# Patient Record
Sex: FEMALE | Race: WHITE | NOT HISPANIC OR LATINO | Employment: FULL TIME | URBAN - METROPOLITAN AREA
[De-identification: names, ages, dates, MRNs, and addresses within clinical notes are randomized per-mention and may not be internally consistent; named-entity substitution may affect disease eponyms.]

---

## 2024-09-04 ENCOUNTER — HOSPITAL ENCOUNTER (EMERGENCY)
Facility: CLINIC | Age: 29
Discharge: HOME | End: 2024-09-04
Admitting: PHYSICIAN ASSISTANT

## 2024-09-04 VITALS
OXYGEN SATURATION: 99 % | HEIGHT: 69 IN | HEART RATE: 77 BPM | DIASTOLIC BLOOD PRESSURE: 60 MMHG | TEMPERATURE: 98.5 F | RESPIRATION RATE: 16 BRPM | SYSTOLIC BLOOD PRESSURE: 133 MMHG | WEIGHT: 185 LBS | BODY MASS INDEX: 27.4 KG/M2

## 2024-09-04 DIAGNOSIS — N93.9 VAGINAL BLEEDING: ICD-10-CM

## 2024-09-04 DIAGNOSIS — R10.2 PELVIC PAIN: ICD-10-CM

## 2024-09-04 LAB
ABO/RH(D): NORMAL
ANION GAP SERPL CALCULATED.3IONS-SCNC: 12 MMOL/L (ref 7–15)
ANTIBODY SCREEN: NEGATIVE
BASOPHILS # BLD AUTO: 0 10E3/UL (ref 0–0.2)
BASOPHILS NFR BLD AUTO: 1 %
BUN SERPL-MCNC: 11.7 MG/DL (ref 6–20)
CALCIUM SERPL-MCNC: 9.4 MG/DL (ref 8.8–10.4)
CHLORIDE SERPL-SCNC: 106 MMOL/L (ref 98–107)
CREAT SERPL-MCNC: 0.75 MG/DL (ref 0.51–0.95)
EGFRCR SERPLBLD CKD-EPI 2021: >90 ML/MIN/1.73M2
EOSINOPHIL # BLD AUTO: 0.2 10E3/UL (ref 0–0.7)
EOSINOPHIL NFR BLD AUTO: 4 %
ERYTHROCYTE [DISTWIDTH] IN BLOOD BY AUTOMATED COUNT: 12.1 % (ref 10–15)
GLUCOSE SERPL-MCNC: 89 MG/DL (ref 70–99)
HCG INTACT+B SERPL-ACNC: 3 MIU/ML
HCO3 SERPL-SCNC: 22 MMOL/L (ref 22–29)
HCT VFR BLD AUTO: 37.9 % (ref 35–47)
HGB BLD-MCNC: 12.8 G/DL (ref 11.7–15.7)
IMM GRANULOCYTES # BLD: 0 10E3/UL
IMM GRANULOCYTES NFR BLD: 0 %
LYMPHOCYTES # BLD AUTO: 2.4 10E3/UL (ref 0.8–5.3)
LYMPHOCYTES NFR BLD AUTO: 39 %
MCH RBC QN AUTO: 30.3 PG (ref 26.5–33)
MCHC RBC AUTO-ENTMCNC: 33.8 G/DL (ref 31.5–36.5)
MCV RBC AUTO: 90 FL (ref 78–100)
MONOCYTES # BLD AUTO: 0.5 10E3/UL (ref 0–1.3)
MONOCYTES NFR BLD AUTO: 8 %
NEUTROPHILS # BLD AUTO: 3.1 10E3/UL (ref 1.6–8.3)
NEUTROPHILS NFR BLD AUTO: 49 %
NRBC # BLD AUTO: 0 10E3/UL
NRBC BLD AUTO-RTO: 0 /100
PLATELET # BLD AUTO: 339 10E3/UL (ref 150–450)
POTASSIUM SERPL-SCNC: 4.1 MMOL/L (ref 3.4–5.3)
RBC # BLD AUTO: 4.22 10E6/UL (ref 3.8–5.2)
SODIUM SERPL-SCNC: 140 MMOL/L (ref 135–145)
SPECIMEN EXPIRATION DATE: NORMAL
WBC # BLD AUTO: 6.2 10E3/UL (ref 4–11)

## 2024-09-04 PROCEDURE — 99283 EMERGENCY DEPT VISIT LOW MDM: CPT

## 2024-09-04 PROCEDURE — 80048 BASIC METABOLIC PNL TOTAL CA: CPT | Performed by: PHYSICIAN ASSISTANT

## 2024-09-04 PROCEDURE — 84702 CHORIONIC GONADOTROPIN TEST: CPT | Performed by: PHYSICIAN ASSISTANT

## 2024-09-04 PROCEDURE — 86900 BLOOD TYPING SEROLOGIC ABO: CPT | Performed by: PHYSICIAN ASSISTANT

## 2024-09-04 PROCEDURE — 85025 COMPLETE CBC W/AUTO DIFF WBC: CPT | Performed by: PHYSICIAN ASSISTANT

## 2024-09-04 PROCEDURE — 36415 COLL VENOUS BLD VENIPUNCTURE: CPT | Performed by: PHYSICIAN ASSISTANT

## 2024-09-04 ASSESSMENT — COLUMBIA-SUICIDE SEVERITY RATING SCALE - C-SSRS
6. HAVE YOU EVER DONE ANYTHING, STARTED TO DO ANYTHING, OR PREPARED TO DO ANYTHING TO END YOUR LIFE?: NO
2. HAVE YOU ACTUALLY HAD ANY THOUGHTS OF KILLING YOURSELF IN THE PAST MONTH?: NO
1. IN THE PAST MONTH, HAVE YOU WISHED YOU WERE DEAD OR WISHED YOU COULD GO TO SLEEP AND NOT WAKE UP?: NO

## 2024-09-04 NOTE — ED PROVIDER NOTES
EMERGENCY DEPARTMENT ENCOUNTER      NAME: Sonam Leon  AGE: 29 year old female  YOB: 1995  MRN: [id number]  EVALUATION DATE & TIME: No admission date for patient encounter.    PCP: No Ref-Primary, Physician    ED PROVIDER: Yanick Pineda PA-C      Chief Complaint   Patient presents with    Vaginal Bleeding         FINAL IMPRESSION:  1. Vaginal bleeding    2. Pelvic pain          ED COURSE & MEDICAL DECISION MAKING:    Pertinent Labs & Imaging studies reviewed. (See chart for details)  5:10 PM I met the patient and performed my initial interview and exam.   7:00 PM Spoke with BUSHRA Leon  7:01 PM Patient seen and updated on OBGYN recommendations, plan for discharge.     29 year old female presents to the Emergency Department for evaluation of vaginal bleeding.     ED Course as of 09/04/24 1910   Wed Sep 04, 2024   1721 Patient is a 29-year-old female presents emergency department for evaluation of ongoing vaginal bleeding.  Patient reportedly had a miscarriage on July 13, was put on mifepristone.  She has had ongoing vaginal bleeding and cramping since then.  She was seen by [facility] emergency department yesterday, had an ultrasound that was questionable for retained products, hemoglobin was stable, she reportedly did not see OB/GYN and they were unable to consult OB at [facility] for some reason, to return to the emergency department here today.  Having ongoing cramping and vaginal bleeding.  She reports going through a pad every 2-3 hours.  Will repeat labs here in the emergency department, and then discussed with OB/GYN.  Suspect likely outpatient follow-up given that this been ongoing for quite some time.  She is otherwise well-appearing here, not febrile, tachycardic tachypneic.  Not hypoxic.  Does not have any significant abdominal pain, tenderness, rebound or guarding.   1757 Hemoglobin: 12.8   1757 Hemoglobin stable here at 12.8 here in the emergency department.   1902     I spoke with  on-call OB/GYN here, will set up patient follow-up with OB/GYN.  No indication for emergent intervention at this point.  They will contact the patient tomorrow for set up an appointment in clinic.   1910     Patient reevaluated, updated on laboratory results and imaging here.  Patient will follow-up with OB/GYN as an outpatient.  They will contact her tomorrow, additionally gave her the contact information.  She is agreeable with this plan.  Plan for discharge at this time.       Medical Decision Making  Obtained supplemental history:Supplemental history obtained?: No  Reviewed external records: External records reviewed?: Outpatient Record: Patient ED visit to [facility] yesterday  Care impacted by chronic illness:N/A  Care significantly affected by social determinants of health:N/A  Did you consider but not order tests?: Work up considered but not performed and documented in chart, if applicable  Did you interpret images independently?: Independent interpretation of ECG and images noted in documentation, when applicable.  Consultation discussion with other provider:Did you involve another provider (consultant, MH, pharmacy, etc.)?: No  Discharge. No recommendations on prescription strength medication(s). N/A.           At the conclusion of the encounter I discussed the results of all of the tests and the disposition. The questions were answered. The patient or family acknowledged understanding and was agreeable with the care plan.       0 minutes of critical care time     MEDICATIONS GIVEN IN THE EMERGENCY:  Medications - No data to display    NEW PRESCRIPTIONS STARTED AT TODAY'S ER VISIT  New Prescriptions    No medications on file          =================================================================    HPI    Patient information was obtained from: Patient     Use of : N/A      Sonam Leon is a 29 year old female with no significant past medical history who presents to this ED  for  "evaluation of vaginal bleeding.  Patient reportedly has had ongoing vaginal bleeding from July 13.  Patient reportedly had miscarriage, was put on mifepristone, and has had some cramping and soaking through a pad every 2-3 hours, since she took the medicine. She was seen at [facility] yesterday had full workup and was recommended follow-up with OB/GYN.  She returns emergency department for evaluation of ongoing bleeding.  Reports, she was any cancer yesterday, they attempted to talk with OB/GYN, however not able to for some reason.  She returns emergency department today with ongoing symptoms.  No significant worsening since yesterday.    Per chart review: 09/03/2024 Patient seen at [facility] yesterday for vaginal bleeding and cramping after taking mifepristone.  Passing heavy clots.  Vaginal exam shows os appears closed.  Ultrasound showed endometrial stripe thick.  Ultrasound of the pelvis showed no definitive intrauterine pregnancy, possible concern for retained products of conception.  hCG was 3.7.  Hemoglobin stable at 13.1.    PAST MEDICAL HISTORY:  No past medical history on file.    PAST SURGICAL HISTORY:  No past surgical history on file.        CURRENT MEDICATIONS:    No current outpatient medications on file.       ALLERGIES:  Allergies   Allergen Reactions    Azithromycin Hives and Rash       FAMILY HISTORY:  No family history on file.    SOCIAL HISTORY:   Social History     Socioeconomic History    Marital status: Single       VITALS:  /60   Pulse 77   Temp 98.5  F (36.9  C) (Oral)   Resp 16   Ht 1.753 m (5' 9\")   Wt 83.9 kg (185 lb)   SpO2 99%   BMI 27.32 kg/m      PHYSICAL EXAM    Physical Exam  Vitals and nursing note reviewed.   Constitutional:       General: She is not in acute distress.     Appearance: Normal appearance. She is normal weight. She is not toxic-appearing or diaphoretic.   HENT:      Right Ear: External ear normal.      Left Ear: External ear normal. "   Eyes:      Conjunctiva/sclera: Conjunctivae normal.   Cardiovascular:      Rate and Rhythm: Normal rate and regular rhythm.      Pulses: Normal pulses.   Pulmonary:      Effort: Pulmonary effort is normal. No respiratory distress.      Breath sounds: No wheezing or rales.   Chest:      Chest wall: No tenderness.   Abdominal:      General: Abdomen is flat.      Palpations: Abdomen is soft.      Tenderness: There is abdominal tenderness. There is no right CVA tenderness, left CVA tenderness, guarding or rebound.      Comments: Minimal abdominal tenderness without rebound or guarding.   Neurological:      Mental Status: She is alert. Mental status is at baseline.          LAB:  All pertinent labs reviewed and interpreted.  Labs Ordered and Resulted from Time of ED Arrival to Time of ED Departure   BASIC METABOLIC PANEL - Normal       Result Value    Sodium 140      Potassium 4.1      Chloride 106      Carbon Dioxide (CO2) 22      Anion Gap 12      Urea Nitrogen 11.7      Creatinine 0.75      GFR Estimate >90      Calcium 9.4      Glucose 89     HCG QUANTITATIVE PREGNANCY - Normal    hCG Quantitative 3     CBC WITH PLATELETS AND DIFFERENTIAL    WBC Count 6.2      RBC Count 4.22      Hemoglobin 12.8      Hematocrit 37.9      MCV 90      MCH 30.3      MCHC 33.8      RDW 12.1      Platelet Count 339      % Neutrophils 49      % Lymphocytes 39      % Monocytes 8      % Eosinophils 4      % Basophils 1      % Immature Granulocytes 0      NRBCs per 100 WBC 0      Absolute Neutrophils 3.1      Absolute Lymphocytes 2.4      Absolute Monocytes 0.5      Absolute Eosinophils 0.2      Absolute Basophils 0.0      Absolute Immature Granulocytes 0.0      Absolute NRBCs 0.0     TYPE AND SCREEN, ADULT    ABO/RH(D) O POS      Antibody Screen Negative      SPECIMEN EXPIRATION DATE [id number]     ABO/RH TYPE AND SCREEN       RADIOLOGY:  Reviewed all pertinent imaging. Please see official radiology report.  No orders to display      PROCEDURES:   None.     Yanick Pineda PA-C  Emergency Medicine  [facility] EMERGENCY ROOM  [address] [address] [phone]  Dept: [phone]       Yanick Pineda PA-C  09/04/24 3730

## 2024-09-04 NOTE — ED TRIAGE NOTES
Patient presents to ED with vaginal bleeding that has been ongoing since she had a miscarriage on July 13th, having cramping r/t to that, soaking through a pad every 2-3 hours, stable hemoglobin yesterday at [facility].  Cheri Prince RN.......9/4/2024 4:49 PM     Triage Assessment (Adult)       Row Name 09/04/24 3657          Triage Assessment    Airway WDL WDL        Respiratory WDL    Respiratory WDL WDL        Skin Circulation/Temperature WDL    Skin Circulation/Temperature WDL WDL        Cardiac WDL    Cardiac WDL WDL        Peripheral/Neurovascular WDL    Peripheral Neurovascular WDL WDL        Cognitive/Neuro/Behavioral WDL    Cognitive/Neuro/Behavioral WDL WDL

## 2024-09-05 NOTE — DISCHARGE INSTRUCTIONS
You were seen here in the emergency department for evaluation of vaginal bleeding, pelvic pain.  I did discuss your case with on-call OB/GYN here from [facility], they should call you to schedule an appointment tomorrow for follow-up.  Your hemoglobin here is stable, no significant changes.  OB did not recommend additional laboratory studies here.  I have included the number for [facility] above if you do not hear from them tomorrow.    For pain or fever you may use:  -Tylenol 650 mg every 6 hours.  Max 4000 mg in 24 hours  Do not use thismedication with alcohol as it can cause liver problems.  -Ibuprofen 600 mg every 6 hours.  Max 3500 mg in 24 hours  Do not take this medication if you have a history of a GI bleed or have kidney problems.  You may use both of these medications at the same time or you can alternate them every 3 hours.  For example, Tylenol at 6 AM, ibuprofen at 9 AM, Tylenol at noon, etc.

## 2024-10-12 ENCOUNTER — HEALTH MAINTENANCE LETTER (OUTPATIENT)
Age: 29
End: 2024-10-12